# Patient Record
Sex: MALE | Race: WHITE | NOT HISPANIC OR LATINO | URBAN - METROPOLITAN AREA
[De-identification: names, ages, dates, MRNs, and addresses within clinical notes are randomized per-mention and may not be internally consistent; named-entity substitution may affect disease eponyms.]

---

## 2018-03-30 ENCOUNTER — OFFICE VISIT CONVERTED (OUTPATIENT)
Dept: CARDIOLOGY | Facility: CLINIC | Age: 73
End: 2018-03-30
Attending: INTERNAL MEDICINE

## 2018-04-12 ENCOUNTER — OFFICE VISIT CONVERTED (OUTPATIENT)
Dept: PULMONOLOGY | Facility: CLINIC | Age: 73
End: 2018-04-12
Attending: INTERNAL MEDICINE

## 2018-04-13 ENCOUNTER — OFFICE VISIT CONVERTED (OUTPATIENT)
Dept: FAMILY MEDICINE CLINIC | Facility: CLINIC | Age: 73
End: 2018-04-13
Attending: FAMILY MEDICINE

## 2018-10-11 ENCOUNTER — CONVERSION ENCOUNTER (OUTPATIENT)
Dept: CARDIOLOGY | Facility: CLINIC | Age: 73
End: 2018-10-11

## 2018-10-11 ENCOUNTER — OFFICE VISIT CONVERTED (OUTPATIENT)
Dept: CARDIOLOGY | Facility: CLINIC | Age: 73
End: 2018-10-11
Attending: INTERNAL MEDICINE

## 2018-10-15 ENCOUNTER — OFFICE VISIT CONVERTED (OUTPATIENT)
Dept: FAMILY MEDICINE CLINIC | Facility: CLINIC | Age: 73
End: 2018-10-15
Attending: FAMILY MEDICINE

## 2018-10-15 ENCOUNTER — CONVERSION ENCOUNTER (OUTPATIENT)
Dept: FAMILY MEDICINE CLINIC | Facility: CLINIC | Age: 73
End: 2018-10-15

## 2018-11-05 ENCOUNTER — CONVERSION ENCOUNTER (OUTPATIENT)
Dept: SURGERY | Facility: CLINIC | Age: 73
End: 2018-11-05

## 2018-11-05 ENCOUNTER — OFFICE VISIT CONVERTED (OUTPATIENT)
Dept: UROLOGY | Facility: CLINIC | Age: 73
End: 2018-11-05
Attending: UROLOGY

## 2019-03-12 ENCOUNTER — HOSPITAL ENCOUNTER (OUTPATIENT)
Dept: CARDIOLOGY | Facility: HOSPITAL | Age: 74
Discharge: HOME | End: 2019-03-12

## 2019-04-15 ENCOUNTER — CONVERSION ENCOUNTER (OUTPATIENT)
Dept: FAMILY MEDICINE CLINIC | Facility: CLINIC | Age: 74
End: 2019-04-15

## 2019-04-15 ENCOUNTER — OFFICE VISIT CONVERTED (OUTPATIENT)
Dept: FAMILY MEDICINE CLINIC | Facility: CLINIC | Age: 74
End: 2019-04-15
Attending: FAMILY MEDICINE

## 2019-04-16 ENCOUNTER — OFFICE VISIT CONVERTED (OUTPATIENT)
Dept: SURGERY | Facility: CLINIC | Age: 74
End: 2019-04-16
Attending: SURGERY

## 2019-04-16 ENCOUNTER — HOSPITAL ENCOUNTER (OUTPATIENT)
Dept: LAB | Facility: HOSPITAL | Age: 74
Discharge: HOME | End: 2019-04-16
Attending: FAMILY MEDICINE

## 2019-04-16 ENCOUNTER — OFFICE VISIT CONVERTED (OUTPATIENT)
Dept: CARDIOLOGY | Facility: CLINIC | Age: 74
End: 2019-04-16
Attending: INTERNAL MEDICINE

## 2019-04-16 ENCOUNTER — CONVERSION ENCOUNTER (OUTPATIENT)
Dept: CARDIOLOGY | Facility: CLINIC | Age: 74
End: 2019-04-16

## 2019-04-16 LAB
ALBUMIN SERPL-MCNC: 4 G/DL (ref 3.5–5)
ALBUMIN/GLOB SERPL: 1.4 {RATIO} (ref 1.4–2.6)
ALP SERPL-CCNC: 70 U/L (ref 56–155)
ALT SERPL-CCNC: 13 U/L (ref 10–40)
ANION GAP SERPL CALC-SCNC: 16 MMOL/L (ref 8–19)
AST SERPL-CCNC: 20 U/L (ref 15–50)
BILIRUB SERPL-MCNC: 0.89 MG/DL (ref 0.2–1.3)
BUN SERPL-MCNC: 10 MG/DL (ref 5–25)
BUN/CREAT SERPL: 13 {RATIO} (ref 6–20)
CALCIUM SERPL-MCNC: 9.1 MG/DL (ref 8.7–10.4)
CHLORIDE SERPL-SCNC: 100 MMOL/L (ref 99–111)
CHOLEST SERPL-MCNC: 113 MG/DL (ref 107–200)
CHOLEST/HDLC SERPL: 2 {RATIO} (ref 3–6)
CONV CO2: 29 MMOL/L (ref 22–32)
CONV TOTAL PROTEIN: 6.8 G/DL (ref 6.3–8.2)
CREAT UR-MCNC: 0.8 MG/DL (ref 0.7–1.2)
GFR SERPLBLD BASED ON 1.73 SQ M-ARVRAT: >60 ML/MIN/{1.73_M2}
GLOBULIN UR ELPH-MCNC: 2.8 G/DL (ref 2–3.5)
GLUCOSE SERPL-MCNC: 122 MG/DL (ref 70–99)
HDLC SERPL-MCNC: 57 MG/DL (ref 40–60)
LDLC SERPL CALC-MCNC: 45 MG/DL (ref 70–100)
OSMOLALITY SERPL CALC.SUM OF ELEC: 292 MOSM/KG (ref 273–304)
POTASSIUM SERPL-SCNC: 3.6 MMOL/L (ref 3.5–5.3)
SODIUM SERPL-SCNC: 141 MMOL/L (ref 135–147)
TRIGL SERPL-MCNC: 54 MG/DL (ref 40–150)
VLDLC SERPL-MCNC: 11 MG/DL (ref 5–37)

## 2019-04-18 ENCOUNTER — HOSPITAL ENCOUNTER (OUTPATIENT)
Dept: GENERAL RADIOLOGY | Facility: HOSPITAL | Age: 74
Discharge: HOME | End: 2019-04-18
Attending: INTERNAL MEDICINE

## 2021-05-15 VITALS
HEIGHT: 73 IN | WEIGHT: 199 LBS | HEART RATE: 78 BPM | SYSTOLIC BLOOD PRESSURE: 132 MMHG | BODY MASS INDEX: 26.37 KG/M2 | DIASTOLIC BLOOD PRESSURE: 64 MMHG

## 2021-05-15 VITALS
SYSTOLIC BLOOD PRESSURE: 137 MMHG | DIASTOLIC BLOOD PRESSURE: 105 MMHG | OXYGEN SATURATION: 100 % | WEIGHT: 200 LBS | BODY MASS INDEX: 26.51 KG/M2 | HEART RATE: 73 BPM | HEIGHT: 73 IN

## 2021-05-15 VITALS — RESPIRATION RATE: 16 BRPM | HEIGHT: 73 IN | BODY MASS INDEX: 26.31 KG/M2 | WEIGHT: 198.5 LBS

## 2021-05-16 VITALS
WEIGHT: 198 LBS | BODY MASS INDEX: 26.24 KG/M2 | HEIGHT: 73 IN | DIASTOLIC BLOOD PRESSURE: 64 MMHG | SYSTOLIC BLOOD PRESSURE: 122 MMHG

## 2021-05-16 VITALS
HEIGHT: 73 IN | WEIGHT: 200 LBS | BODY MASS INDEX: 26.51 KG/M2 | HEART RATE: 72 BPM | DIASTOLIC BLOOD PRESSURE: 66 MMHG | SYSTOLIC BLOOD PRESSURE: 140 MMHG

## 2021-05-16 VITALS
WEIGHT: 209 LBS | DIASTOLIC BLOOD PRESSURE: 74 MMHG | HEIGHT: 73 IN | BODY MASS INDEX: 27.7 KG/M2 | SYSTOLIC BLOOD PRESSURE: 144 MMHG | HEART RATE: 74 BPM

## 2021-05-16 VITALS
WEIGHT: 209.25 LBS | HEART RATE: 84 BPM | DIASTOLIC BLOOD PRESSURE: 53 MMHG | HEIGHT: 72 IN | SYSTOLIC BLOOD PRESSURE: 140 MMHG | BODY MASS INDEX: 28.34 KG/M2

## 2021-05-16 VITALS
BODY MASS INDEX: 26.64 KG/M2 | SYSTOLIC BLOOD PRESSURE: 129 MMHG | DIASTOLIC BLOOD PRESSURE: 50 MMHG | HEART RATE: 77 BPM | WEIGHT: 201 LBS | HEIGHT: 73 IN

## 2021-05-28 VITALS
OXYGEN SATURATION: 100 % | HEIGHT: 72 IN | WEIGHT: 207.5 LBS | BODY MASS INDEX: 28.1 KG/M2 | HEART RATE: 75 BPM | DIASTOLIC BLOOD PRESSURE: 61 MMHG | RESPIRATION RATE: 14 BRPM | TEMPERATURE: 97.9 F | SYSTOLIC BLOOD PRESSURE: 139 MMHG

## 2021-05-28 NOTE — PROGRESS NOTES
Patient: TAE SANTILLAN SHELL     Acct: [id number]     Report: #[id number]  UNIT #: [id number]     : 1945    Encounter Date:2018  PRIMARY CARE: DAHLIA MCCARTHY  ***Signed***  --------------------------------------------------------------------------------------------------------------------  Chief Complaint      Encounter Date      2018            Primary Care Provider      RADHA Mcclendon            Referring Provider      RADHA Mcclendon            Patient Complaint      Patient is complaining of      1 year follow up            VITALS      Height 6 ft 0 in / 182.88 cm      Weight 207 lbs 8 oz / 94.251411 kg      BSA 2.20 m2      BMI 28.1 kg/m2      Temperature 97.9 F / 36.61 C - Oral      Pulse 75      Respirations 14      Blood Pressure 139/61 Sitting, Right Arm      Pulse Oximetry 100%, Room air            HPI      The patient is a very pleasant 72 year old white male here today for follow up.             The patient had low dose lung cancer screening CT scan that was unremarkable.     The patient is still smoking on a daily basis. He wants to cut back and is     actively trying. He currently smokes 1 pack of cigarettes a day, he has smoked     more a times. At this time he has no increased cough and no increased sputum     production and no shortness of breath.            ROS      Constitutional:  Complains of: Fatigue, Denies: Fever, Weight gain, Weight loss    , Chills, Insomnia, Other      Respiratory/Breathing:  Denies: Shortness of air, Wheezing, Cough, Hemoptysis,     Pleuritic pain, Other      Endocrine:  Denies: Polydipsia, Polyuria, Heat/cold intolerance, Diabetes, Other      Eyes:  Denies: Blurred vision, Vision Changes, Other      Ears, nose, mouth, throat:  Denies: Mouth lesions, Thrush, Throat pain,     Hoarseness, Allergies/Hay Fever, Post Nasal Drip, Headaches, Recent Head Injury    , Nose Bleeding, Neck Stiffness, Thyroid Mass, Hearing Loss, Ear Fullness, Dry     Mouth, Nasal  or Sinus Pain, Dry Lips, Nasal discharge, Nasal congestion, Other      Cardiovascular:  Denies: Palpitations, Syncope, Claudication, Chest Pain, Wake     up Gasping for air, Leg Swelling, Irregular Heart Rate, Cyanosis, Dyspnea on     Exertion, Other      Gastrointestinal:  Denies: Nausea, Constipation, Diarrhea, Abdominal pain,     Vomiting, Difficulty Swallowing, Reflux/Heartburn, Dysphagia, Jaundice, Bloating    , Melena, Bloody stools, Other      Genitourinary:  Denies: Urinary frequency, Incontinence, Hematuria, Urgency,     Nocturia, Dysuria, Testicular problems, Other      Musculoskeletal:  Denies: Joint Pain, Joint Stiffness, Joint Swelling, Myalgias    , Other      Hematologic/lymphatic:  DENIES: Lymphadenopathy, Bruising, Bleeding tendencies,     Other      Neurological:  Denies: Headache, Numbness, Weakness, Seizures, Other      Psychiatric:  Denies: Anxiety, Appropriate Effect, Depression, Other      Sleep:  No: Excessive daytime sleep, Morning Headache?, Snoring, Insomnia?,     Stop breathing at sleep?, Other      Integumentary:  Denies: Rash, Dry skin, Skin Warm to Touch, Other      Immunologic/Allergic:  Denies: Latex allergy, Seasonal allergies, Asthma,     Urticaria, Eczema, Other      Immunization status:  No: Up to date            FAMILY/SOCIAL/MEDICAL HX      Surgical History:  Yes: Abdominal Surgery (1993 RIGHT INGUINAL HERNIA REPAIR),     Appendectomy (2002), CABG (1984 5 VESSEL), Hernia Surgery, Vascular Surgery (    LEFT LEG BYPASS GRAFT 02/2015, AAA REPAIR 2015), No: AAA Repair, Angioplasty,     Back Surgery, Bladder Surgery, Bowel Surgery, Breast Surgery, Carotid Stenosis,     Cholecystectomy, Ear Surgery, Eye Surgery, Head Surgery, Kidney Surgery, Nose     Surgery, Oral Surgery, Orthopedic Surgery, Prostatectomy, Rectal Surgery,     Spinal Surgery, Testicular Surgery, Throat Surgery, Valve Replacement, Other     Surgeries      Heart - Family Hx:  Mother      Cancer/Type - Family Hx:   "Sister      Is Father Still Living?:  No      Is Mother Still Living?:  No      Social History:  Tobacco Use, No Alcohol Use, No Recreational Drug use      Smoking status:  Current every day smoker (1 ppd x 54 y)      Occupation:  automotive parts and service      Anticoagulation Therapy:  No      Antibiotic Prophylaxis:  No      Medical History:  Yes: Hemorrhoids/Rectal Prob (HIATAL HERNIA, REFLUX), High     Blood Pressure (ON MEDS TO CONTROL), No: Alcoholism, Allergies, Anemia,     Arthritis, Asthma, Blood Disease, Broken Bones, Cataracts, Chemical Dependency,     Chemotherapy/Cancer, Chronic Bronchitis/COPD, Emphysema, Chronic Liver Disease,     Colon Trouble, Colitis, Diverticulitis, Congestive Heart Failu, Deafness or     Ringing Ears, Convulsions, Depression, Anxiety, Bipolar Disorder, Diabetes,     Epilepsy, Seizures, Forgetfullness, Glaucoma, Gall Stones, Gout, Head Injury,     Heart Attack, Heart Murmur, Hepatitis, Hiatal Hernia, High Cholesterol, HIV (Do     not ask - volu, Jaundice, Kidney or Bladder Disease, Kidney Stones, Migrane     Headaches, Mitral Valve Prolapse, Night sweats, Phlebitis, Psychiatric Care,     Reflux Disease, Rheumatic Fever, Sexually Transmitted Dis, Shortness Of Breath,     Sinus Trouble, Skin Disease/Psoriais/Ecz, Stroke, Thyroid Problem, Tuberculosis     or Pos TB Te, Miscellaneous Medical/oth      Psychiatric History      none            PREVENTION      Hx Influenza Vaccination:  No      Influenza Vaccine Declined:  Yes      2 or More Falls Past Year?:  No      Fall Past Year with Injury?:  No      Hx Pneumococcal Vaccination:  No      Encouraged to follow-up with:  PCP regarding preventative exams.      Chart initiated by      roberto bravo ma            ALLERGIES/MEDICATIONS      Allergies:        Coded Allergies:             PENICILLINS (Verified  Adverse Reaction, Unknown, \"KNOCKED OUT\", 4/12/18)      Medications    Last Reconciled on 4/12/18 09:45 by ARMANDO MARSH MD    "   Nicotine (Nicotrol) 168 Cart/Package Cartridge      1 PUFF INH Q1HR Y for NICOTINE REPLACEMENT, #1 BOX 3 Refills         Prov: Yan Batista         3/7/16       amLODIPine (amLODIPine) 5 Mg Tablet      5 MG PO QDAY, #30 TAB         Reported         3/7/16       Cetirizine Hcl (ZyrTEC*) 10 Mg Tablet      10 MG PO QDAY, #30 TAB 0 Refills         Reported         3/7/16       Tramadol Hcl/Acetaminophen (Acetaminophen/Tramadol 325/37.5*) 1 Tab Tablet      1 TAB PO Q4-6H Y for PAIN, #21 TAB         Reported         10/21/15       Benazepril Hcl (LOTENSIN) 20 Mg Tab      20 MG PO BID, #30 TAB 0 Refills         Reported         10/21/15       Clopidogrel Bisulfate (Plavix) 75 Mg Tablet      75 MG PO QDAY, #30 TAB 0 Refills         Reported         10/21/15       Citric Ac/Aspirin/Sod Bicarb ([place]) 1 Tab.eff Tablet.eff      2 TAB PO Q4-6H Y for CONGESTION, #1 BOX         Reported         10/21/15       ClonazePAM (ClonazePAM) 0.5 Mg Tablet      0.5 MG PO HS, #30 TAB 0 Refills         Reported         7/8/15       Meclizine Hcl (Meclizine*) 25 Mg Tablet      25 MG PO TID         Reported         5/3/12       Omega-3 Fatty Acids/Fish Oil (Fish Oil*) 1,200 Mg Capsule      1200 MG PO BID         Reported         5/3/12       Multivitamins (Multi-Vitamin) 1 Tab Tablet      1 TAB PO QDAY         Reported         5/3/12       Simvastatin (Zocor*) 40 Mg Tablet      40 MG PO HS, TAB         Reported         5/3/12       Ezetimide (Zetia) 10 Mg Tablet      10 MG PO QDAY         Reported         5/3/12       Atenolol (Atenolol*) 50 Mg Tablet      25 MG PO BID         Reported         5/3/12      Current Medications      Current Medications Reviewed 4/12/18            EXAM      GEN-patient appears stated age resting comfortable in no acute distress      Eyes-PERRL,  conjunctiva are normal in appearance extraocular muscles are intact    , no scleral icterus      Nasal-both nares are patent turbinates appear normal no polyps  seen no nasal     discharge or ulcerations      Lymphatic-no swollen or enlarged cervical nodes,or axillary node, or femoral     nodes, or supraclavicular nodes      Mouth upper and lower dentures, no erythema no ulcerations oropharynx appears     normal no exudate no evidence of postnasal drip, MP1      Neck-there are no palpable supraclavicular or cervical adenopathy, thyroid is     normal in appearance no apparent nodules, there is no inspiratory or expiratory     stridor      Respiratory-patient exhibits normal work of breathing, speaking in full     sentences without difficulty, the chest is normal in appearance, clear to     auscultation with no wheezes rales or rhonchi, chest is normal to percussion on     both the right and left sides      Cardiovascular-the heart rate is normal and regular S1 and S2 present with no     murmur or extra heart sounds, there is no JVD or pedal edema present      GI-the abdomen is normal in appearance, bowel sounds present and normal in all     quadrants no hepatosplenomegaly or masses felt      Extremities-mild clubbing is present, pulses present in all extremities,     capillary refill time is normal, nicotine stained fingers on the left in      Skin-skin is normal in appearance it is warm and dry, no rashes present, no     evidence of cyanosis,palpation reveals no masses      Neurological-the patient is alert and oriented to time place and person, moves     all 4 extremities, Walks with with the assistance of a cane, normal affect and     mood, CN2-12 intact      Psyc-normal judgment and insight is good, normal mood and affect, alert and     oriented to person, place, and time, and date      Vtials      Vitals:             Height 6 ft 0 in / 182.88 cm           Weight 207 lbs 8 oz / 94.520805 kg           BSA 2.20 m2           BMI 28.1 kg/m2           Temperature 97.9 F / 36.61 C - Oral           Pulse 75           Respirations 14           Blood Pressure 139/61 Sitting,  Right Arm           Pulse Oximetry 100%, Room air            REVIEW      Results Reviewed      [facility] Results Reviewed?:  Yes Prev Lab Results, Yes Prev Radiology Results, Yes     Previous [facility] Records            PLAN      Assessment      Cancer screening - Z12.9            Notes      New Diagnostics      * Low Dose Chest CT, Year       Dx: Cancer screening - Z12.9      ASSESSMENT/PLAN:      1. Multiple pulmonary nodules. Unremarkable on CT scan. Patient will need     yearly CT scans.       2. Ongoing tobacco abuse with active cigarettes smoking. I discussed smoking     cessation with the patient today. I discussed with the patient that given his     smoking history he will need yearly low dose lung cancer screening. Time spent     discussing smoking cessation with the patient today 4 minutes.       3. I have personally reviewed laboratory data, imaging as well as previous     medical records.            Patient Education      Education resources provided:  Yes      Patient Education Provided:  Smoking Cessation                 Disclaimer: Converted document may not contain table formatting or lab diagrams. Please see [employer] for the authenticated document.